# Patient Record
Sex: FEMALE | Race: AMERICAN INDIAN OR ALASKA NATIVE | NOT HISPANIC OR LATINO | Employment: PART TIME | URBAN - METROPOLITAN AREA
[De-identification: names, ages, dates, MRNs, and addresses within clinical notes are randomized per-mention and may not be internally consistent; named-entity substitution may affect disease eponyms.]

---

## 2023-03-22 PROBLEM — M43.16 SPONDYLOLISTHESIS OF LUMBAR REGION: Status: ACTIVE | Noted: 2023-03-22

## 2023-03-23 PROBLEM — M79.2 NEUROPATHIC PAIN: Status: ACTIVE | Noted: 2023-03-23

## 2023-03-23 PROBLEM — D64.9 ANEMIA: Status: ACTIVE | Noted: 2023-03-23

## 2023-03-23 PROBLEM — F41.9 ANXIETY: Status: ACTIVE | Noted: 2023-03-23

## 2023-03-23 PROBLEM — F39 MOOD DISORDER: Status: ACTIVE | Noted: 2023-03-23

## 2023-03-23 PROBLEM — G89.29 OTHER CHRONIC PAIN: Status: ACTIVE | Noted: 2023-03-23

## 2023-03-23 PROBLEM — D62 POSTOPERATIVE ANEMIA DUE TO ACUTE BLOOD LOSS: Status: ACTIVE | Noted: 2023-03-23

## 2023-03-24 PROBLEM — Z01.810 PREOPERATIVE CARDIOVASCULAR EXAMINATION: Status: ACTIVE | Noted: 2023-03-24

## 2023-03-24 PROBLEM — E11.9 TYPE 2 DIABETES MELLITUS, WITHOUT LONG-TERM CURRENT USE OF INSULIN: Status: ACTIVE | Noted: 2023-03-24

## 2023-03-28 ENCOUNTER — PATIENT MESSAGE (OUTPATIENT)
Dept: ADMINISTRATIVE | Facility: CLINIC | Age: 63
End: 2023-03-28

## 2023-03-28 ENCOUNTER — PATIENT OUTREACH (OUTPATIENT)
Dept: ADMINISTRATIVE | Facility: CLINIC | Age: 63
End: 2023-03-28

## 2023-03-28 NOTE — PROGRESS NOTES
C3 nurse 2nd attempt to contact Cara Palacio for a TCC post hospital discharge follow up call. No answer. No voicemail available. The patient has a scheduled HOSFU appointment with Matt Abel MD on 3/31/23 @ 8671.

## 2023-03-28 NOTE — PROGRESS NOTES
C3 nurse attempted to contact Cara Palacio for a TCC post hospital discharge follow up call. No answer. No voicemail available. The patient has a scheduled HOSFU appointment with Matt Abel MD on 3/31/23 @ 7796.

## 2023-03-28 NOTE — PROGRESS NOTES
C3 nurse 3rd attempt to contact Cara Palacio for a TCC post hospital discharge follow up call. No answer. Left voicemail with callback information. The patient has a scheduled HOSFU appointment with   Matt Abel MD on 3/31/23@3121.

## 2023-03-29 NOTE — PROGRESS NOTES
C3 nurse 4th attempt to contact Cara Palacio for a TCC post hospital discharge follow up call. No answer. Left voicemail with callback information. The patient has a scheduled HOSFU appointment with   Matt Abel MD on 3/31/23@6251.          [FreeTextEntry1] : B12 shot and BP check [de-identified] : Pt is here for vitamin B12 shot and other medical conditions.

## 2023-03-30 NOTE — PROGRESS NOTES
C3 nurse spoke with Cara Palacio for a TCC post hospital discharge follow up call. The patient has a scheduled [facility] appointment with ERMA CAROLINA MD on 3/31/23 @ 3030.

## 2023-03-30 NOTE — PROGRESS NOTES
C3 nurse 5th attempt to contact Cara Palacio for a TCC post hospital discharge follow up call. No answer. The patient has a scheduled HOSFU appointment with   Matt Abel MD on 3/31/23@7262.

## 2023-04-03 ENCOUNTER — PATIENT OUTREACH (OUTPATIENT)
Dept: ADMINISTRATIVE | Facility: HOSPITAL | Age: 63
End: 2023-04-03

## 2023-04-03 DIAGNOSIS — E11.9 TYPE 2 DIABETES MELLITUS WITHOUT COMPLICATION, WITHOUT LONG-TERM CURRENT USE OF INSULIN: ICD-10-CM

## 2023-04-03 DIAGNOSIS — Z12.31 BREAST CANCER SCREENING BY MAMMOGRAM: Primary | ICD-10-CM

## 2024-09-09 ENCOUNTER — PATIENT OUTREACH (OUTPATIENT)
Dept: ADMINISTRATIVE | Facility: HOSPITAL | Age: 64
End: 2024-09-09